# Patient Record
Sex: FEMALE | Race: WHITE | Employment: FULL TIME | URBAN - METROPOLITAN AREA
[De-identification: names, ages, dates, MRNs, and addresses within clinical notes are randomized per-mention and may not be internally consistent; named-entity substitution may affect disease eponyms.]

---

## 2017-03-13 ENCOUNTER — APPOINTMENT (OUTPATIENT)
Dept: GENERAL RADIOLOGY | Facility: CLINIC | Age: 37
End: 2017-03-13
Attending: FAMILY MEDICINE
Payer: COMMERCIAL

## 2017-03-13 ENCOUNTER — HOSPITAL ENCOUNTER (EMERGENCY)
Facility: CLINIC | Age: 37
Discharge: HOME | End: 2017-03-13
Attending: FAMILY MEDICINE | Admitting: FAMILY MEDICINE
Payer: COMMERCIAL

## 2017-03-13 VITALS
DIASTOLIC BLOOD PRESSURE: 85 MMHG | HEART RATE: 89 BPM | RESPIRATION RATE: 20 BRPM | TEMPERATURE: 97.2 F | OXYGEN SATURATION: 98 % | SYSTOLIC BLOOD PRESSURE: 132 MMHG

## 2017-03-13 DIAGNOSIS — R07.89 CHEST PAIN, NON-CARDIAC: ICD-10-CM

## 2017-03-13 LAB
ALBUMIN SERPL-MCNC: 3.5 G/DL (ref 3.4–5)
ALP SERPL-CCNC: 85 U/L (ref 40–150)
ALT SERPL W P-5'-P-CCNC: 25 U/L (ref 0–50)
ANION GAP SERPL CALCULATED.3IONS-SCNC: 6 MMOL/L (ref 3–14)
AST SERPL W P-5'-P-CCNC: 13 U/L (ref 0–45)
BASOPHILS # BLD AUTO: 0.1 10E9/L (ref 0–0.2)
BASOPHILS NFR BLD AUTO: 0.7 %
BILIRUB SERPL-MCNC: 0.3 MG/DL (ref 0.2–1.3)
BUN SERPL-MCNC: 9 MG/DL (ref 7–30)
CALCIUM SERPL-MCNC: 8.5 MG/DL (ref 8.5–10.1)
CHLORIDE SERPL-SCNC: 107 MMOL/L (ref 94–109)
CO2 SERPL-SCNC: 27 MMOL/L (ref 20–32)
CREAT SERPL-MCNC: 0.63 MG/DL (ref 0.52–1.04)
D DIMER PPP FEU-MCNC: 0.3 UG/ML FEU (ref 0–0.5)
DIFFERENTIAL METHOD BLD: NORMAL
EOSINOPHIL # BLD AUTO: 0.2 10E9/L (ref 0–0.7)
EOSINOPHIL NFR BLD AUTO: 2.5 %
ERYTHROCYTE [DISTWIDTH] IN BLOOD BY AUTOMATED COUNT: 13.2 % (ref 10–15)
GFR SERPL CREATININE-BSD FRML MDRD: NORMAL ML/MIN/1.7M2
GLUCOSE SERPL-MCNC: 92 MG/DL (ref 70–99)
HCT VFR BLD AUTO: 43.5 % (ref 35–47)
HGB BLD-MCNC: 14.5 G/DL (ref 11.7–15.7)
IMM GRANULOCYTES # BLD: 0 10E9/L (ref 0–0.4)
IMM GRANULOCYTES NFR BLD: 0.3 %
LIPASE SERPL-CCNC: 120 U/L (ref 73–393)
LYMPHOCYTES # BLD AUTO: 2.2 10E9/L (ref 0.8–5.3)
LYMPHOCYTES NFR BLD AUTO: 32 %
MCH RBC QN AUTO: 30.4 PG (ref 26.5–33)
MCHC RBC AUTO-ENTMCNC: 33.3 G/DL (ref 31.5–36.5)
MCV RBC AUTO: 91 FL (ref 78–100)
MONOCYTES # BLD AUTO: 0.7 10E9/L (ref 0–1.3)
MONOCYTES NFR BLD AUTO: 9.9 %
NEUTROPHILS # BLD AUTO: 3.8 10E9/L (ref 1.6–8.3)
NEUTROPHILS NFR BLD AUTO: 54.6 %
PLATELET # BLD AUTO: 215 10E9/L (ref 150–450)
POTASSIUM SERPL-SCNC: 4.1 MMOL/L (ref 3.4–5.3)
PROT SERPL-MCNC: 7.2 G/DL (ref 6.8–8.8)
RBC # BLD AUTO: 4.77 10E12/L (ref 3.8–5.2)
SODIUM SERPL-SCNC: 140 MMOL/L (ref 133–144)
TROPONIN I SERPL-MCNC: NORMAL UG/L (ref 0–0.04)
WBC # BLD AUTO: 6.9 10E9/L (ref 4–11)

## 2017-03-13 PROCEDURE — 83690 ASSAY OF LIPASE: CPT | Performed by: FAMILY MEDICINE

## 2017-03-13 PROCEDURE — 71020 XR CHEST 2 VW: CPT

## 2017-03-13 PROCEDURE — 25000128 H RX IP 250 OP 636: Performed by: FAMILY MEDICINE

## 2017-03-13 PROCEDURE — 93005 ELECTROCARDIOGRAM TRACING: CPT

## 2017-03-13 PROCEDURE — 96374 THER/PROPH/DIAG INJ IV PUSH: CPT

## 2017-03-13 PROCEDURE — 84484 ASSAY OF TROPONIN QUANT: CPT | Performed by: FAMILY MEDICINE

## 2017-03-13 PROCEDURE — 93010 ELECTROCARDIOGRAM REPORT: CPT | Performed by: FAMILY MEDICINE

## 2017-03-13 PROCEDURE — 85025 COMPLETE CBC W/AUTO DIFF WBC: CPT | Performed by: FAMILY MEDICINE

## 2017-03-13 PROCEDURE — 99285 EMERGENCY DEPT VISIT HI MDM: CPT | Mod: 25

## 2017-03-13 PROCEDURE — 80053 COMPREHEN METABOLIC PANEL: CPT | Performed by: FAMILY MEDICINE

## 2017-03-13 PROCEDURE — 99285 EMERGENCY DEPT VISIT HI MDM: CPT | Mod: 25 | Performed by: FAMILY MEDICINE

## 2017-03-13 PROCEDURE — 85379 FIBRIN DEGRADATION QUANT: CPT | Performed by: FAMILY MEDICINE

## 2017-03-13 RX ORDER — KETOROLAC TROMETHAMINE 30 MG/ML
30 INJECTION, SOLUTION INTRAMUSCULAR; INTRAVENOUS ONCE
Status: COMPLETED | OUTPATIENT
Start: 2017-03-13 | End: 2017-03-13

## 2017-03-13 RX ORDER — SUCRALFATE ORAL 1 G/10ML
1 SUSPENSION ORAL 4 TIMES DAILY
Qty: 100 ML | Refills: 0 | Status: SHIPPED | OUTPATIENT
Start: 2017-03-13 | End: 2019-04-30

## 2017-03-13 RX ADMIN — KETOROLAC TROMETHAMINE 30 MG: 30 INJECTION, SOLUTION INTRAMUSCULAR at 13:14

## 2017-03-13 ASSESSMENT — ENCOUNTER SYMPTOMS
DIAPHORESIS: 0
BLOOD IN STOOL: 0
HEADACHES: 1
ABDOMINAL PAIN: 0
VOMITING: 0
CHILLS: 0
SORE THROAT: 0
CHEST TIGHTNESS: 1
SINUS PRESSURE: 0
FEVER: 0
DYSURIA: 0
COUGH: 0
CONSTIPATION: 0
PALPITATIONS: 0
FREQUENCY: 0
DIARRHEA: 0
NAUSEA: 0
WHEEZING: 0
SHORTNESS OF BREATH: 0

## 2017-03-13 NOTE — ED NOTES
Intermittent chest pain since last Tuesday that radiates down left neck/arm and feels like pressure.   Provider in room.

## 2017-03-13 NOTE — ED NOTES
Pt walked to bathroom with no difficulty.   Writer was present during walk and in the bathroom per pt request.

## 2017-03-13 NOTE — ED PROVIDER NOTES
History     Chief Complaint   Patient presents with     Chest Pain     HPI  Funmi Coughlin is a 36 year old female who chest pain left sided left of sternum upper radiating into the left arm since tuesday.  no injury..  ?pleuritic. intermittent chest paroxysms of pain, otherwise an ache. non-exertional.  No associated shortness of breath, nausea/vomiting.. No fever. No cough. no wheezing.    positive tobacco    grandfather and aunt with Factor V Leiden  Denies recent prolonged travel (>3 hours) by car or plane, history or FHx of venous thromboembolism, recent surgery (last 4 weeks), active cancer history, hypercoagulable state, estrogen or other medications/conditions causing VTE or  new unilateral swelling or pain in the legs or calves.    History of GERD, hiatal hernia  migraines  Patient Active Problem List   Diagnosis     Other acne     Encounter for other general counseling or advice on contraception     Depressive disorder, not elsewhere classified     Current Outpatient Prescriptions   Medication Sig Dispense Refill     butalbital-acetaminophen-caffeine (FIORICET, ESGIC) -40 MG per tablet Take 1 tablet by mouth every 4 hours as needed       phentermine 37.5 MG capsule Take 37.5 mg by mouth every morning       cyclobenzaprine (FLEXERIL) 10 MG tablet Take 10 mg by mouth 3 times daily as needed        sulindac (CLINORIL) 200 MG tablet Take 1 tablet (200 mg) by mouth 2 times daily (with meals) never use with ibuprofen 20 tablet 0     IBUPROFEN PO Take 800 mg by mouth every 8 hours as needed for moderate pain        OMEPRAZOLE PO Take 40 mg by mouth every evening       SERTRALINE HCL PO Take 50 mg by mouth daily          Allergies   Allergen Reactions     Amoxicillin Rash     Ceclor [Cefaclor] Rash     Codeine Sulfate Nausea and Vomiting     Penicillins Rash     Varenicline Other (See Comments)     Nightmares         I have reviewed the Medications, Allergies, Past Medical and Surgical History, and  Social History in the Epic system.    Review of Systems   Constitutional: Negative for chills, diaphoresis and fever.   HENT: Negative for ear pain, sinus pressure and sore throat.    Eyes: Negative for visual disturbance.   Respiratory: Positive for chest tightness (central to left upper). Negative for cough, shortness of breath and wheezing.    Cardiovascular: Negative for palpitations.   Gastrointestinal: Negative for abdominal pain, blood in stool, constipation, diarrhea, nausea and vomiting.   Genitourinary: Negative for dysuria, frequency and urgency.   Skin: Negative for rash.   Neurological: Positive for headaches (intermittent, mild).   All other systems reviewed and are negative.      Physical Exam   BP: (!) 149/97  Pulse: 89  Temp: 97.2  F (36.2  C)  Resp: 20  SpO2: 98 %  Physical Exam   Constitutional: No distress.   HENT:   Mouth/Throat: Oropharynx is clear and moist.   Neck: Neck supple.   Cardiovascular: Normal rate and regular rhythm.  Exam reveals no gallop and no friction rub.    No murmur heard.  Pulses:       Radial pulses are 2+ on the right side, and 2+ on the left side.   Pulmonary/Chest: Effort normal and breath sounds normal. No respiratory distress. She has no wheezes. She has no rales. She exhibits tenderness.   Abdominal: She exhibits no distension. There is no tenderness. There is no rebound and no guarding.   Musculoskeletal: She exhibits no edema.   Neurological: She is alert.   Skin: No rash noted. She is not diaphoretic.       ED Course     ED Course     Procedures        EKG at 1252 hrs. with sinus rhythm at 70 bpm with a normal axis and no ST change.  No T wave changes.  Normal R progression and no Q waves.  no ectopy.  Normal intervals.  Normal conduction.  Impression sinus rhythm 70 bpm no change from EKG done on 09/05/2015    Critical Care time:  none     Results for orders placed or performed during the hospital encounter of 03/13/17   Chest XR,  PA & LAT    Narrative    XR  CHEST 2 VW 3/13/2017 1:51 PM    HISTORY: chest pain      Impression    IMPRESSION: Negative.    WILL GARCIA MD   Troponin I   Result Value Ref Range    Troponin I ES  0.000 - 0.045 ug/L     <0.015  The 99th percentile for upper reference range is 0.045 ug/L.  Troponin values in   the range of 0.045 - 0.120 ug/L may be associated with risks of adverse   clinical events.     CBC with platelets, differential   Result Value Ref Range    WBC 6.9 4.0 - 11.0 10e9/L    RBC Count 4.77 3.8 - 5.2 10e12/L    Hemoglobin 14.5 11.7 - 15.7 g/dL    Hematocrit 43.5 35.0 - 47.0 %    MCV 91 78 - 100 fl    MCH 30.4 26.5 - 33.0 pg    MCHC 33.3 31.5 - 36.5 g/dL    RDW 13.2 10.0 - 15.0 %    Platelet Count 215 150 - 450 10e9/L    Diff Method Automated Method     % Neutrophils 54.6 %    % Lymphocytes 32.0 %    % Monocytes 9.9 %    % Eosinophils 2.5 %    % Basophils 0.7 %    % Immature Granulocytes 0.3 %    Absolute Neutrophil 3.8 1.6 - 8.3 10e9/L    Absolute Lymphocytes 2.2 0.8 - 5.3 10e9/L    Absolute Monocytes 0.7 0.0 - 1.3 10e9/L    Absolute Eosinophils 0.2 0.0 - 0.7 10e9/L    Absolute Basophils 0.1 0.0 - 0.2 10e9/L    Abs Immature Granulocytes 0.0 0 - 0.4 10e9/L   Comprehensive metabolic panel   Result Value Ref Range    Sodium 140 133 - 144 mmol/L    Potassium 4.1 3.4 - 5.3 mmol/L    Chloride 107 94 - 109 mmol/L    Carbon Dioxide 27 20 - 32 mmol/L    Anion Gap 6 3 - 14 mmol/L    Glucose 92 70 - 99 mg/dL    Urea Nitrogen 9 7 - 30 mg/dL    Creatinine 0.63 0.52 - 1.04 mg/dL    GFR Estimate >90  Non  GFR Calc   >60 mL/min/1.7m2    GFR Estimate If Black >90   GFR Calc   >60 mL/min/1.7m2    Calcium 8.5 8.5 - 10.1 mg/dL    Bilirubin Total 0.3 0.2 - 1.3 mg/dL    Albumin 3.5 3.4 - 5.0 g/dL    Protein Total 7.2 6.8 - 8.8 g/dL    Alkaline Phosphatase 85 40 - 150 U/L    ALT 25 0 - 50 U/L    AST 13 0 - 45 U/L   Lipase   Result Value Ref Range    Lipase 120 73 - 393 U/L   D dimer quantitative   Result Value Ref  Range    D Dimer 0.3 0.0 - 0.50 ug/ml FEU                  Assessments & Plan (with Medical Decision Making)     MDM: Char Coughlin is a 36 year old female presented with one week of anterior chest pain radiating into the left arm that was nonexertional and without significant underlying risks other than tobacco abuse. symmetric radial pulses and no radiation to back. She has also possible family history of factor V Leiden but has no history of venous thromboembolism herself and no other risk factors. d-dimer normal. Troponin is normal. EKG is the same as prior with no acute changes. We discussed differential diagnosis including gi causes as she does have a history of hiatal hernia and reflux. Also discussed chest wall pain although she had no relief with Toradol given in the emergency department. Her chest however is tender to palpation and reproduces some of her symptoms. Radiation into the left arm is more difficult to identify a common cause although esophageal could cause this as well. She has no underlying neck pain or radicular symptoms. Discussed recommendations as below and recommended follow-up with her primary provider this week  I have reviewed the nursing notes.    I have reviewed the findings, diagnosis, plan and need for follow up with the patient.    New Prescriptions    No medications on file       Final diagnoses:   Chest pain, non-cardiac - Unclear cause.  normal tests including D-dimer, troponin, ekg, cxr.  No relief with toradol, but still would consider chest wall.  other possible causes include esophagus - ladarius with history of GERD and hiatal hernia.  In addition to omeprazole could consider carafate/sucralfate.  Follow-up in clinic witin 3-4 days for re-eval.   radiation into arm, could have multiple causes including cervical disc.  esophagus can also radiate pain this way.       3/13/2017   [facility] EMERGENCY DEPARTMENT     Roldan Martinez MD  03/13/17 0042

## 2017-03-13 NOTE — DISCHARGE INSTRUCTIONS
ICD-10-CM    1. Chest pain, non-cardiac R07.89     Unclear cause.  normal tests including D-dimer, troponin, ekg, cxr.  No relief with toradol, but still would consider chest wall.  other possible causes include esophagus - ladarius with history of GERD and hiatal hernia.  In addition to omeprazole could consider carafate/sucralfate.  Follow-up in clinic witin 3-4 days for re-eval.   radiation into arm, could have multiple causes including cervical disc.  esophagus can also radiate pain this way.          *CHEST PAIN, UNCERTAIN CAUSE    Based on your exam today, the exact cause of your chest pain is not certain. Your condition does not seem serious at this time, and your pain does not appear to be coming from your heart. However, sometimes the signs of a serious problem take more time to appear. Therefore, watch for the warning signs listed below.  HOME CARE:  1. Rest today and avoid strenuous activity.  2. Take any prescribed medicine as directed.  FOLLOW UP with your doctor in 1-3 days.   GET PROMPT MEDICAL ATTENTION if any of the following occur:    A change in the type of pain: if it feels different, becomes more severe, lasts longer, or begins to spread into your shoulder, arm, neck, jaw or back    Shortness of breath or increased pain with breathing    Weakness, dizziness, or fainting    Cough with blood or dark colored sputum (phlegm)    Fever over 101  F (38.3  C)    Swelling, pain or redness in one leg    8239-7343 [facility], [address] [place] [address], [place] [address]. All rights reserved. This information is not intended as a substitute for professional medical care. Always follow your healthcare professional's instructions.

## 2017-03-13 NOTE — ED AVS SNAPSHOT
[facility] Emergency Department    [address] [address]    Phone:  [phone]    Fax:  [phone]                                       Funmi Coughlin   MRN: [id number]    Department:  [facility] Emergency Department   Date of Visit:  3/13/2017           Patient Information     Date Of Birth          1980        Your diagnoses for this visit were:     Chest pain, non-cardiac Unclear cause.  normal tests including D-dimer, troponin, ekg, cxr.  No relief with toradol, but still would consider chest wall.  other possible causes include esophagus - ladarius with history of GERD and hiatal hernia.  In addition to omeprazole could consider carafate/sucralfate.  Follow-up in clinic witin 3-4 days for re-eval.   radiation into arm, could have multiple causes including cervical disc.  esophagus can also radiate pain this way.       You were seen by Roldan Martinez MD.      Follow-up Information     Follow up with Jania Rojas MD In 3 days.    Specialty:  Family Practice    Contact information:    [facility]  [address] [address]  [phone]          Follow up with [facility] Emergency Department.    Specialty:  EMERGENCY MEDICINE    Why:  As needed, If symptoms worsen    Contact information:    [address] [place] [id number] [phone]    Additional information:    The medical center is located at   [address]. (between [address] and   [address] in [place], [place] miles north   of [place]).        Discharge Instructions         ICD-10-CM    1. Chest pain, non-cardiac R07.89     Unclear cause.  normal tests including D-dimer, troponin, ekg, cxr.  No relief with toradol, but still would consider chest wall.  other possible causes include esophagus - ladarius with history of GERD and hiatal hernia.  In addition to omeprazole could consider carafate/sucralfate.  Follow-up in clinic witin 3-4 days for re-eval.   radiation into arm,  could have multiple causes including cervical disc.  esophagus can also radiate pain this way.          *CHEST PAIN, UNCERTAIN CAUSE    Based on your exam today, the exact cause of your chest pain is not certain. Your condition does not seem serious at this time, and your pain does not appear to be coming from your heart. However, sometimes the signs of a serious problem take more time to appear. Therefore, watch for the warning signs listed below.  HOME CARE:  1. Rest today and avoid strenuous activity.  2. Take any prescribed medicine as directed.  FOLLOW UP with your doctor in 1-3 days.   GET PROMPT MEDICAL ATTENTION if any of the following occur:    A change in the type of pain: if it feels different, becomes more severe, lasts longer, or begins to spread into your shoulder, arm, neck, jaw or back    Shortness of breath or increased pain with breathing    Weakness, dizziness, or fainting    Cough with blood or dark colored sputum (phlegm)    Fever over 101  F (38.3  C)    Swelling, pain or redness in one leg    8372-1564 [place] [address]. All rights reserved. This information is not intended as a substitute for professional medical care. Always follow your healthcare professional's instructions.      24 Hour Appointment Hotline       To make an appointment at any [facility] clinic, call [phone] ([phone]). If you don't have a family doctor or clinic, we will help you find one. [facility] clinics are conveniently located to serve the needs of you and your family.             Review of your medicines      START taking        Dose / Directions Last dose taken    sucralfate 1 GM/10ML suspension   Commonly known as:  CARAFATE   Dose:  1 g   Quantity:  100 mL        Take 10 mLs (1 g) by mouth 4 times daily   Refills:  0          Our records show that you are taking the medicines listed below. If these are incorrect, please call your family doctor or clinic.         Dose / Directions Last dose taken    cyclobenzaprine 10 MG tablet   Commonly known as:  FLEXERIL   Dose:  10 mg        Take 10 mg by mouth 3 times daily as needed   Refills:  0        IBUPROFEN PO   Dose:  800 mg        Take 800 mg by mouth every 8 hours as needed for moderate pain   Refills:  0        OMEPRAZOLE PO   Dose:  40 mg        Take 40 mg by mouth every evening   Refills:  0        sulindac 200 MG tablet   Commonly known as:  CLINORIL   Dose:  200 mg   Quantity:  20 tablet        Take 1 tablet (200 mg) by mouth 2 times daily (with meals) never use with ibuprofen   Refills:  0                Prescriptions were sent or printed at these locations (1 Prescription)                   [id number] - [place], [place] - [address] AT [facility] [place]   [address] [address]    Telephone:  [phone]   Fax:  [phone]   Hours:                  E-Prescribed (1 of 1)         sucralfate (CARAFATE) 1 GM/10ML suspension                Procedures and tests performed during your visit     CBC with platelets, differential    Chest XR,  PA & LAT    Comprehensive metabolic panel    D dimer quantitative    EKG 12 lead    Lipase    Troponin I      Orders Needing Specimen Collection     None      Pending Results     No orders found from 3/11/2017 to 3/14/2017.            Pending Culture Results     No orders found from 3/11/2017 to 3/14/2017.             Test Results from your hospital stay     3/13/2017  1:41 PM - Interface, Flexilab Results      Component Results     Component Value Ref Range & Units Status    Troponin I ES  0.000 - 0.045 ug/L Final    <0.015  The 99th percentile for upper reference range is 0.045 ug/L.  Troponin values in   the range of 0.045 - 0.120 ug/L may be associated with risks of adverse   clinical events.           3/13/2017  2:02 PM - Interface, Radiant Ib      Narrative     XR CHEST 2 VW 3/13/2017 1:51 PM    HISTORY: chest pain         Impression     IMPRESSION: Negative.    WILL GARCIA MD         3/13/2017  1:23 PM - Interface, Flexilab Results      Component Results     Component Value Ref Range & Units Status    WBC 6.9 4.0 - 11.0 10e9/L Final    RBC Count 4.77 3.8 - 5.2 10e12/L Final    Hemoglobin 14.5 11.7 - 15.7 g/dL Final    Hematocrit 43.5 35.0 - 47.0 % Final    MCV 91 78 - 100 fl Final    MCH 30.4 26.5 - 33.0 pg Final    MCHC 33.3 31.5 - 36.5 g/dL Final    RDW 13.2 10.0 - 15.0 % Final    Platelet Count 215 150 - 450 10e9/L Final    Diff Method Automated Method  Final    % Neutrophils 54.6 % Final    % Lymphocytes 32.0 % Final    % Monocytes 9.9 % Final    % Eosinophils 2.5 % Final    % Basophils 0.7 % Final    % Immature Granulocytes 0.3 % Final    Absolute Neutrophil 3.8 1.6 - 8.3 10e9/L Final    Absolute Lymphocytes 2.2 0.8 - 5.3 10e9/L Final    Absolute Monocytes 0.7 0.0 - 1.3 10e9/L Final    Absolute Eosinophils 0.2 0.0 - 0.7 10e9/L Final    Absolute Basophils 0.1 0.0 - 0.2 10e9/L Final    Abs Immature Granulocytes 0.0 0 - 0.4 10e9/L Final         3/13/2017  1:41 PM - Interface, Flexilab Results      Component Results     Component Value Ref Range & Units Status    Sodium 140 133 - 144 mmol/L Final    Potassium 4.1 3.4 - 5.3 mmol/L Final    Chloride 107 94 - 109 mmol/L Final    Carbon Dioxide 27 20 - 32 mmol/L Final    Anion Gap 6 3 - 14 mmol/L Final    Glucose 92 70 - 99 mg/dL Final    Urea Nitrogen 9 7 - 30 mg/dL Final    Creatinine 0.63 0.52 - 1.04 mg/dL Final    GFR Estimate >90  Non  GFR Calc   >60 mL/min/1.7m2 Final    GFR Estimate If Black >90   GFR Calc   >60 mL/min/1.7m2 Final    Calcium 8.5 8.5 - 10.1 mg/dL Final    Bilirubin Total 0.3 0.2 - 1.3 mg/dL Final    Albumin 3.5 3.4 - 5.0 g/dL Final    Protein Total 7.2 6.8 - 8.8 g/dL Final    Alkaline Phosphatase 85 40 - 150 U/L Final    ALT 25 0 - 50 U/L Final    AST 13 0 - 45 U/L Final         3/13/2017  1:41 PM - Interface, Flexilab Results  "     Component Results     Component Value Ref Range & Units Status    Lipase 120 73 - 393 U/L Final         3/13/2017  1:33 PM - Interface, Flexilab Results      Component Results     Component Value Ref Range & Units Status    D Dimer 0.3 0.0 - 0.50 ug/ml FEU Final    This D-dimer assay is intended for use in conjuntion with a clinical pretest   probability assessment model to exclude pulmonary embolism (PE) and as an aid   in the diagnosis of deep venous thrombosis (DVT) in outpatients suspected of   PE   or DVT. The cut-off value is 0.5 g/mL FEU.                  Thank you for choosing [facility]       Thank you for choosing [facility] for your care. Our goal is always to provide you with excellent care. Hearing back from our patients is one way we can continue to improve our services. Please take a few minutes to complete the written survey that you may receive in the mail after you visit with us. Thank you!        [employer] Information     [employer] lets you send messages to your doctor, view your test results, renew your prescriptions, schedule appointments and more. To sign up, go to [email or web address] . Click on \"Log in\" on the left side of the screen, which will take you to the Welcome page. Then click on \"Sign up Now\" on the right side of the page.     You will be asked to enter the access code listed below, as well as some personal information. Please follow the directions to create your username and password.     Your access code is: [email or web address]  Expires: 2017  2:11 PM     Your access code will  in 90 days. If you need help or a new code, please call your [facility] clinic or [phone].        Care EveryWhere ID     This is your Care EveryWhere ID. This could be used by other organizations to access your [facility] medical records  [id number]        After Visit Summary       This is your record. Keep this with you and show to your community pharmacist(s) and doctor(s) at your next visit.  "

## 2017-03-13 NOTE — ED AVS SNAPSHOT
[facility] Emergency Department    [address] [address]    Phone:  [phone]    Fax:  [phone]                                       Funmi Coughlin   MRN: [id number]    Department:  [facility] Emergency Department   Date of Visit:  3/13/2017           After Visit Summary Signature Page     I have received my discharge instructions, and my questions have been answered. I have discussed any challenges I see with this plan with the nurse or doctor.    ..........................................................................................................................................  Patient/Patient Representative Signature      ..........................................................................................................................................  Patient Representative Print Name and Relationship to Patient    ..................................................               ................................................  Date                                            Time    ..........................................................................................................................................  Reviewed by Signature/Title    ...................................................              ..............................................  Date                                                            Time

## 2017-07-17 ENCOUNTER — APPOINTMENT (OUTPATIENT)
Dept: GENERAL RADIOLOGY | Facility: CLINIC | Age: 37
End: 2017-07-17
Attending: FAMILY MEDICINE
Payer: COMMERCIAL

## 2017-07-17 ENCOUNTER — HOSPITAL ENCOUNTER (EMERGENCY)
Facility: CLINIC | Age: 37
Discharge: HOME | End: 2017-07-17
Attending: EMERGENCY MEDICINE | Admitting: EMERGENCY MEDICINE
Payer: COMMERCIAL

## 2017-07-17 VITALS
BODY MASS INDEX: 33.8 KG/M2 | TEMPERATURE: 98.6 F | HEIGHT: 64 IN | RESPIRATION RATE: 16 BRPM | WEIGHT: 198 LBS | DIASTOLIC BLOOD PRESSURE: 85 MMHG | OXYGEN SATURATION: 97 % | SYSTOLIC BLOOD PRESSURE: 132 MMHG

## 2017-07-17 DIAGNOSIS — M79.672 LEFT FOOT PAIN: ICD-10-CM

## 2017-07-17 PROCEDURE — 99283 EMERGENCY DEPT VISIT LOW MDM: CPT

## 2017-07-17 PROCEDURE — 99283 EMERGENCY DEPT VISIT LOW MDM: CPT | Performed by: EMERGENCY MEDICINE

## 2017-07-17 PROCEDURE — 73630 X-RAY EXAM OF FOOT: CPT | Mod: LT

## 2017-07-17 NOTE — ED AVS SNAPSHOT
[facility] Emergency Department    [address] [address]    Phone:  [phone]    Fax:  [phone]                                       Funmi Coughlin   MRN: [id number]    Department:  [facility] Emergency Department   Date of Visit:  7/17/2017           After Visit Summary Signature Page     I have received my discharge instructions, and my questions have been answered. I have discussed any challenges I see with this plan with the nurse or doctor.    ..........................................................................................................................................  Patient/Patient Representative Signature      ..........................................................................................................................................  Patient Representative Print Name and Relationship to Patient    ..................................................               ................................................  Date                                            Time    ..........................................................................................................................................  Reviewed by Signature/Title    ...................................................              ..............................................  Date                                                            Time

## 2017-07-17 NOTE — ED AVS SNAPSHOT
[facility] Emergency Department    [address] [address]    Phone:  [phone]    Fax:  [phone]                                       Funmi Coughlin   MRN: [id number]    Department:  [facility] Emergency Department   Date of Visit:  7/17/2017           Patient Information     Date Of Birth          1980        Your diagnoses for this visit were:     Left foot pain        You were seen by Sha Victoria MD.        Discharge Instructions       Use rest, ice, acetaminophen, and ibuprofen to help with her symptoms.  Use the hard soled shoe to give support to your foot over the next several days.  Get rechecked if not improved in one week.    24 Hour Appointment Hotline       To make an appointment at any [place] clinic, call [phone] ([phone]). If you don't have a family doctor or clinic, we will help you find one. [place] clinics are conveniently located to serve the needs of you and your family.          ED Discharge Orders     Post-Op Shoe                    Review of your medicines      Our records show that you are taking the medicines listed below. If these are incorrect, please call your family doctor or clinic.        Dose / Directions Last dose taken    cyclobenzaprine 10 MG tablet   Commonly known as:  FLEXERIL   Dose:  10 mg        Take 10 mg by mouth 3 times daily as needed   Refills:  0        IBUPROFEN PO   Dose:  800 mg        Take 800 mg by mouth every 8 hours as needed for moderate pain   Refills:  0        OMEPRAZOLE PO   Dose:  40 mg        Take 40 mg by mouth every evening   Refills:  0        sucralfate 1 GM/10ML suspension   Commonly known as:  CARAFATE   Dose:  1 g   Quantity:  100 mL        Take 10 mLs (1 g) by mouth 4 times daily   Refills:  0        sulindac 200 MG tablet   Commonly known as:  CLINORIL   Dose:  200 mg   Quantity:  20 tablet        Take 1 tablet (200 mg) by mouth 2 times daily (with meals) never use with  "ibuprofen   Refills:  0                Procedures and tests performed during your visit     Foot XR, G/E 3 views, left      Orders Needing Specimen Collection     None      Pending Results     No orders found from 7/15/2017 to 7/18/2017.            Pending Culture Results     No orders found from 7/15/2017 to 7/18/2017.            Pending Results Instructions     If you had any lab results that were not finalized at the time of your Discharge, you can call the ED Lab Result RN at [phone]. You will be contacted by this team for any positive Lab results or changes in treatment. The nurses are available 7 days a week from 10A to 6:30P.  You can leave a message 24 hours per day and they will return your call.        Test Results From Your Hospital Stay        7/17/2017 10:41 PM      Narrative     XR FOOT LT G/E 3 VW 7/17/2017 10:37 PM    HISTORY: Trauma.    COMPARISON: None.    FINDINGS: No fracture or malalignment. Osseous structures are within  normal limits.        Impression     IMPRESSION: No acute osseous abnormality.    RAÚL ASCENCIO MD                Thank you for choosing [facility]       Thank you for choosing [facility] for your care. Our goal is always to provide you with excellent care. Hearing back from our patients is one way we can continue to improve our services. Please take a few minutes to complete the written survey that you may receive in the mail after you visit with us. Thank you!        [employer] Information     "[employer]," lets you send messages to your doctor, view your test results, renew your prescriptions, schedule appointments and more. To sign up, go to [email or web address] . Click on \"Log in\" on the left side of the screen, which will take you to the Welcome page. Then click on \"Sign up Now\" on the right side of the page.     You will be asked to enter the access code listed below, as well as some personal information. Please follow the directions to create your username and password.   "   Your access code is: [email or web address]  Expires: 10/15/2017 10:47 PM     Your access code will  in 90 days. If you need help or a new code, please call your [place] clinic or [phone].        Care EveryWhere ID     This is your Care EveryWhere ID. This could be used by other organizations to access your [place] medical records  [id number]        Equal Access to Services     ADDI MOY : Hadii julia magañao Sojaelyn, waaxda luqadaha, qaybta kaalmada adezo, josy rasmussen . So [facility] [phone].    ATENCIÓN: Si habla español, tiene a lion disposición servicios gratuitos de asistencia lingüística. Llame al [phone].    We comply with applicable federal civil rights laws and [place] laws. We do not discriminate on the basis of race, color, national origin, age, disability sex, sexual orientation or gender identity.            After Visit Summary       This is your record. Keep this with you and show to your community pharmacist(s) and doctor(s) at your next visit.

## 2017-07-18 NOTE — ED PROVIDER NOTES
"  History     Chief Complaint   Patient presents with     Foot Pain     L foot injury/ fall     [facility]  Funmi Coughlin is a 36 year old female who presents for left foot pain.  Symptoms started just prior to arrival when she slipped in the garage and had an injury of the foot.  She says that she thinks her forefoot was caught underneath and bent back.  She denies falling or hitting her head.  Pain is located in the lateral midfoot, moderate in severity.  She has taken ibuprofen.  No headache, nausea, vomiting.    Past medical history includes GERD  Daily medications include omeprazole  Allergies include amoxicillin, cefaclor, penicillin, varenicline, codeine  Current every day smoker    I have reviewed the Medications, Allergies, Past Medical and Surgical History, and Social History in the Epic system.         Review of Systems  A 2 point review of systems was performed. All pertinent positives and negatives were listed in the HPI and rest of ROS were otherwise negative.    Physical Exam   BP: 132/85  Heart Rate: 93  Temp: 98.6  F (37  C)  Resp: 16  Height: 162.6 cm (5' 4\")  Weight: 89.8 kg (198 lb)  SpO2: 97 %  Physical Exam   Constitutional: She is oriented to person, place, and time. She appears well-developed and well-nourished. No distress.   HENT:   Head: Normocephalic and atraumatic.   Eyes: No scleral icterus.   Neck: Normal range of motion. Neck supple.   Musculoskeletal:   Left Foot and Ankle: swelling over the lateral midfoot with tenderness at the base of the 5th metatarsal; no deformity, erythema, or warmth; no tenderness over medial or lateral malleoli, navicular, or ankle syndesmosis; full ankle ROM; anterior drawer test negative; no laxity of the Lisfranc joint complex.   Neurological: She is alert and oriented to person, place, and time.   Skin: Skin is warm and dry. No rash noted. She is not diaphoretic. No erythema. No pallor.       ED Course     ED Course     Procedures             Critical Care " time:  none               Labs Ordered and Resulted from Time of ED Arrival Up to the Time of Departure from the ED - No data to display    Assessments & Plan (with Medical Decision Making)   36-year-old female who presents for left foot pain.  Differential includes fracture, dislocation, soft tissue injury.  X-ray of the foot obtained, images reviewed independently as well as radiology reviewed, no definite fracture seen, however there is a small line through the 5th metatarsal that may represent an avulsion fracture of the styloid.  Regardless of if this is a fracture or not, she is placed in an Ace wrap and given a postop shoe and crutches.  She is told to use the crutches for a short amount of time as possible.  Ibuprofen and acetaminophen for pain.  Recheck if not improved in one week.  The patient is in agreement with this plan.    I have reviewed the nursing notes.    I have reviewed the findings, diagnosis, plan and need for follow up with the patient.       New Prescriptions    No medications on file       Final diagnoses:   Left foot pain       7/17/2017   [facility] EMERGENCY DEPARTMENT     Sha Victoria MD  07/17/17 [id number]

## 2017-07-18 NOTE — DISCHARGE INSTRUCTIONS
Use rest, ice, acetaminophen, and ibuprofen to help with her symptoms.  Use the hard soled shoe to give support to your foot over the next several days.  Get rechecked if not improved in one week.

## 2017-08-26 ENCOUNTER — HEALTH MAINTENANCE LETTER (OUTPATIENT)
Age: 37
End: 2017-08-26

## 2018-02-18 ENCOUNTER — APPOINTMENT (OUTPATIENT)
Dept: GENERAL RADIOLOGY | Facility: CLINIC | Age: 38
End: 2018-02-18
Attending: EMERGENCY MEDICINE
Payer: COMMERCIAL

## 2018-02-18 ENCOUNTER — HOSPITAL ENCOUNTER (EMERGENCY)
Facility: CLINIC | Age: 38
Discharge: HOME | End: 2018-02-18
Attending: EMERGENCY MEDICINE | Admitting: EMERGENCY MEDICINE
Payer: COMMERCIAL

## 2018-02-18 VITALS
TEMPERATURE: 98.6 F | OXYGEN SATURATION: 96 % | DIASTOLIC BLOOD PRESSURE: 58 MMHG | SYSTOLIC BLOOD PRESSURE: 112 MMHG | RESPIRATION RATE: 16 BRPM

## 2018-02-18 DIAGNOSIS — M79.672 LEFT FOOT PAIN: ICD-10-CM

## 2018-02-18 DIAGNOSIS — R07.89 CHEST WALL PAIN: ICD-10-CM

## 2018-02-18 LAB
ANION GAP SERPL CALCULATED.3IONS-SCNC: 7 MMOL/L (ref 3–14)
BASOPHILS # BLD AUTO: 0 10E9/L (ref 0–0.2)
BASOPHILS NFR BLD AUTO: 0.4 %
BUN SERPL-MCNC: 11 MG/DL (ref 7–30)
CALCIUM SERPL-MCNC: 8.7 MG/DL (ref 8.5–10.1)
CHLORIDE SERPL-SCNC: 106 MMOL/L (ref 94–109)
CO2 SERPL-SCNC: 27 MMOL/L (ref 20–32)
CREAT SERPL-MCNC: 0.54 MG/DL (ref 0.52–1.04)
D DIMER PPP FEU-MCNC: <0.3 UG/ML FEU (ref 0–0.5)
DIFFERENTIAL METHOD BLD: NORMAL
EOSINOPHIL # BLD AUTO: 0.2 10E9/L (ref 0–0.7)
EOSINOPHIL NFR BLD AUTO: 2.4 %
ERYTHROCYTE [DISTWIDTH] IN BLOOD BY AUTOMATED COUNT: 13.2 % (ref 10–15)
GFR SERPL CREATININE-BSD FRML MDRD: >90 ML/MIN/1.7M2
GLUCOSE SERPL-MCNC: 102 MG/DL (ref 70–99)
HCT VFR BLD AUTO: 44.3 % (ref 35–47)
HGB BLD-MCNC: 15.2 G/DL (ref 11.7–15.7)
IMM GRANULOCYTES # BLD: 0 10E9/L (ref 0–0.4)
IMM GRANULOCYTES NFR BLD: 0.3 %
LYMPHOCYTES # BLD AUTO: 3.2 10E9/L (ref 0.8–5.3)
LYMPHOCYTES NFR BLD AUTO: 33.3 %
MCH RBC QN AUTO: 31 PG (ref 26.5–33)
MCHC RBC AUTO-ENTMCNC: 34.3 G/DL (ref 31.5–36.5)
MCV RBC AUTO: 90 FL (ref 78–100)
MONOCYTES # BLD AUTO: 0.9 10E9/L (ref 0–1.3)
MONOCYTES NFR BLD AUTO: 9.3 %
NEUTROPHILS # BLD AUTO: 5.2 10E9/L (ref 1.6–8.3)
NEUTROPHILS NFR BLD AUTO: 54.3 %
PLATELET # BLD AUTO: 239 10E9/L (ref 150–450)
POTASSIUM SERPL-SCNC: 3.7 MMOL/L (ref 3.4–5.3)
RBC # BLD AUTO: 4.91 10E12/L (ref 3.8–5.2)
SODIUM SERPL-SCNC: 140 MMOL/L (ref 133–144)
TROPONIN I SERPL-MCNC: <0.015 UG/L (ref 0–0.04)
TROPONIN I SERPL-MCNC: <0.015 UG/L (ref 0–0.04)
WBC # BLD AUTO: 9.6 10E9/L (ref 4–11)

## 2018-02-18 PROCEDURE — 93010 ELECTROCARDIOGRAM REPORT: CPT | Mod: Z6 | Performed by: EMERGENCY MEDICINE

## 2018-02-18 PROCEDURE — 99285 EMERGENCY DEPT VISIT HI MDM: CPT | Mod: 25 | Performed by: EMERGENCY MEDICINE

## 2018-02-18 PROCEDURE — 93005 ELECTROCARDIOGRAM TRACING: CPT

## 2018-02-18 PROCEDURE — 80048 BASIC METABOLIC PNL TOTAL CA: CPT | Performed by: EMERGENCY MEDICINE

## 2018-02-18 PROCEDURE — 85025 COMPLETE CBC W/AUTO DIFF WBC: CPT | Performed by: EMERGENCY MEDICINE

## 2018-02-18 PROCEDURE — 99285 EMERGENCY DEPT VISIT HI MDM: CPT

## 2018-02-18 PROCEDURE — 85379 FIBRIN DEGRADATION QUANT: CPT | Performed by: EMERGENCY MEDICINE

## 2018-02-18 PROCEDURE — 71046 X-RAY EXAM CHEST 2 VIEWS: CPT

## 2018-02-18 PROCEDURE — 84484 ASSAY OF TROPONIN QUANT: CPT | Performed by: EMERGENCY MEDICINE

## 2018-02-18 NOTE — ED AVS SNAPSHOT
[facility] Emergency Department    [address] [address]    Phone:  [phone]    Fax:  [phone]                                       Funmi Coughlin   MRN: [id number]    Department:  [facility] Emergency Department   Date of Visit:  2/18/2018           After Visit Summary Signature Page     I have received my discharge instructions, and my questions have been answered. I have discussed any challenges I see with this plan with the nurse or doctor.    ..........................................................................................................................................  Patient/Patient Representative Signature      ..........................................................................................................................................  Patient Representative Print Name and Relationship to Patient    ..................................................               ................................................  Date                                            Time    ..........................................................................................................................................  Reviewed by Signature/Title    ...................................................              ..............................................  Date                                                            Time

## 2018-02-18 NOTE — ED AVS SNAPSHOT
[facility] Emergency Department    [address] [address]    Phone:  [phone]    Fax:  [phone]                                       Funmi Coughlin   MRN: [id number]    Department:  [facility] Emergency Department   Date of Visit:  2/18/2018           Patient Information     Date Of Birth          1980        Your diagnoses for this visit were:     Chest wall pain     Left foot pain        You were seen by Roldan Lopez MD.      Follow-up Information     Follow up with Jania Rojas MD.    Specialty:  Family Practice    Contact information:    [facility]  [address] [address]  [phone]          Discharge Instructions          *CHEST PAIN, UNCERTAIN CAUSE    Based on your exam today, the exact cause of your chest pain is not certain. Your condition does not seem serious at this time, and your pain does not appear to be coming from your heart. However, sometimes the signs of a serious problem take more time to appear. Therefore, watch for the warning signs listed below.  HOME CARE:  1. Rest today and avoid strenuous activity.  2. Take any prescribed medicine as directed.  FOLLOW UP with your doctor in 1-3 days.   GET PROMPT MEDICAL ATTENTION if any of the following occur:    A change in the type of pain: if it feels different, becomes more severe, lasts longer, or begins to spread into your shoulder, arm, neck, jaw or back    Shortness of breath or increased pain with breathing    Weakness, dizziness, or fainting    Cough with blood or dark colored sputum (phlegm)    Fever over 101  F (38.3  C)    Swelling, pain or redness in one leg    4407-8933 [employer]. [address] [place] [address]. All rights reserved. This information is not intended as a substitute for professional medical care. Always follow your healthcare professional's instructions.  This information has been modified by your health care  provider with permission from the publisher.      Chest Wall Pain: Costochondritis    The chest pain that you have had today is caused by costochondritis. This condition is caused by an inflammation of the cartilage joining your ribs to your breastbone. It is not caused by heart or lung problems. Your healthcare team has made sure that the chest pain you feel is not from a life threatening cause of chest pain such as heart attack, collapsed lung, blood clot in the lung, tear in the aorta, or esophageal rupture. The inflammation may have been brought on by a blow to the chest, lifting heavy objects, intense exercise, or an illness that made you cough and sneeze a lot. It often occurs during times of emotional stress. It can be painful, but it is not dangerous. It usually goes away in 1 to 2 weeks. But it may happen again. Rarely, a more serious condition may cause symptoms similar to costochondritis. That s why it s important to watch for the warning signs listed below.  Home care  Follow these guidelines when caring for yourself at home:    If you feel that emotional stress is a cause of your condition, try to figure out the sources of that stress. It may not be obvious. Learn ways to deal with the stress in your life. This can include regular exercise, muscle relaxation, meditation, or simply taking time out for yourself.    You may use acetaminophen, ibuprofen, or naproxen to control pain, unless another pain medicine was prescribed. If you have liver or kidney disease or ever had a stomach ulcer, talk with your healthcare provider before using these medicines.    You can also help ease pain by using a hot, wet compress or heating pad. Use this with or without a medicated skin cream that helps relieves pain.    Do stretching exercise as advised by your provider.    Take any prescribed medicines as directed.  Follow-up care  Follow up with your healthcare provider, or as advised, if you do not start to get better  in the next 2 days.  When to seek medical advice  Call your healthcare provider right away if any of these occur:    A change in the type of pain. Call if it feels different, becomes more serious, lasts longer, or spreads into your shoulder, arm, neck, jaw, or back.    Shortness of breath or pain gets worse when you breathe    Weakness, dizziness, or fainting    Cough with dark-colored sputum (phlegm) or blood    Abdominal pain    Dark red or black stools    Fever of 100.4 F (38 C) or higher, or as directed by your healthcare provider  Date Last Reviewed: 12/1/2016 2000-2017 [employer]. [address] [place] [address]. All rights reserved. This information is not intended as a substitute for professional medical care. Always follow your healthcare professional's instructions.      Tylenol/ibuprofen for discomfort    24 Hour Appointment Hotline       To make an appointment at any [place] clinic, call [phone] ([phone]). If you don't have a family doctor or clinic, we will help you find one. [place] clinics are conveniently located to serve the needs of you and your family.             Review of your medicines      Our records show that you are taking the medicines listed below. If these are incorrect, please call your family doctor or clinic.        Dose / Directions Last dose taken    cyclobenzaprine 10 MG tablet   Commonly known as:  FLEXERIL   Dose:  10 mg        Take 10 mg by mouth 3 times daily as needed   Refills:  0        IBUPROFEN PO   Dose:  800 mg        Take 800 mg by mouth every 8 hours as needed for moderate pain   Refills:  0        OMEPRAZOLE PO   Dose:  40 mg        Take 40 mg by mouth every evening   Refills:  0        sucralfate 1 GM/10ML suspension   Commonly known as:  CARAFATE   Dose:  1 g   Quantity:  100 mL        Take 10 mLs (1 g) by mouth 4 times daily   Refills:  0        sulindac 200 MG tablet   Commonly known as:  CLINORIL   Dose:  200 mg    Quantity:  20 tablet        Take 1 tablet (200 mg) by mouth 2 times daily (with meals) never use with ibuprofen   Refills:  0                Procedures and tests performed during your visit     Procedure/Test Number of Times Performed    Basic metabolic panel 1    CBC with platelets, differential 1    Chest XR,  PA & LAT 1    D dimer quantitative 1    EKG 12 lead 1    Troponin I 2      Orders Needing Specimen Collection     None      Pending Results     No orders found from 2/16/2018 to 2/19/2018.            Pending Culture Results     No orders found from 2/16/2018 to 2/19/2018.            Pending Results Instructions     If you had any lab results that were not finalized at the time of your Discharge, you can call the ED Lab Result RN at [phone]. You will be contacted by this team for any positive Lab results or changes in treatment. The nurses are available 7 days a week from 10A to 6:30P.  You can leave a message 24 hours per day and they will return your call.        Test Results From Your Hospital Stay        2/18/2018  1:52 PM      Component Results     Component Value Ref Range & Units Status    WBC 9.6 4.0 - 11.0 10e9/L Final    RBC Count 4.91 3.8 - 5.2 10e12/L Final    Hemoglobin 15.2 11.7 - 15.7 g/dL Final    Hematocrit 44.3 35.0 - 47.0 % Final    MCV 90 78 - 100 fl Final    MCH 31.0 26.5 - 33.0 pg Final    MCHC 34.3 31.5 - 36.5 g/dL Final    RDW 13.2 10.0 - 15.0 % Final    Platelet Count 239 150 - 450 10e9/L Final    Diff Method Automated Method  Final    % Neutrophils 54.3 % Final    % Lymphocytes 33.3 % Final    % Monocytes 9.3 % Final    % Eosinophils 2.4 % Final    % Basophils 0.4 % Final    % Immature Granulocytes 0.3 % Final    Absolute Neutrophil 5.2 1.6 - 8.3 10e9/L Final    Absolute Lymphocytes 3.2 0.8 - 5.3 10e9/L Final    Absolute Monocytes 0.9 0.0 - 1.3 10e9/L Final    Absolute Eosinophils 0.2 0.0 - 0.7 10e9/L Final    Absolute Basophils 0.0 0.0 - 0.2 10e9/L Final    Abs Immature  Granulocytes 0.0 0 - 0.4 10e9/L Final         2/18/2018  1:59 PM      Component Results     Component Value Ref Range & Units Status    Sodium 140 133 - 144 mmol/L Final    Potassium 3.7 3.4 - 5.3 mmol/L Final    Chloride 106 94 - 109 mmol/L Final    Carbon Dioxide 27 20 - 32 mmol/L Final    Anion Gap 7 3 - 14 mmol/L Final    Glucose 102 (H) 70 - 99 mg/dL Final    Urea Nitrogen 11 7 - 30 mg/dL Final    Creatinine 0.54 0.52 - 1.04 mg/dL Final    GFR Estimate >90 >60 mL/min/1.7m2 Final    Non  GFR Calc    GFR Estimate If Black >90 >60 mL/min/1.7m2 Final    African American GFR Calc    Calcium 8.7 8.5 - 10.1 mg/dL Final         2/18/2018  1:59 PM      Component Results     Component Value Ref Range & Units Status    Troponin I ES <0.015 0.000 - 0.045 ug/L Final    The 99th percentile for upper reference range is 0.045 ug/L.  Troponin values   in the range of 0.045 - 0.120 ug/L may be associated with risks of adverse   clinical events.           2/18/2018  2:27 PM      Component Results     Component Value Ref Range & Units Status    Troponin I ES <0.015 0.000 - 0.045 ug/L Final    The 99th percentile for upper reference range is 0.045 ug/L.  Troponin values   in the range of 0.045 - 0.120 ug/L may be associated with risks of adverse   clinical events.           2/18/2018  2:21 PM      Component Results     Component Value Ref Range & Units Status    D Dimer <0.3 0.0 - 0.50 ug/ml FEU Final    This D-dimer assay is intended for use in conjunction with a clinical pretest   probability assessment model to exclude pulmonary embolism (PE) and deep   venous thrombosis (DVT) in outpatients suspected of PE or DVT. The cut-off   value is 0.5 ug/mL FEU.           2/18/2018  2:51 PM      Narrative     CHEST TWO VIEWS  2/18/2018 2:48 PM     HISTORY: Chest pain.    COMPARISON: 3/3/2017        Impression     IMPRESSION: Normal. No change.    VIOLET HEWITT MD                Thank you for choosing [facility]       Thank  "you for choosing [facility] for your care. Our goal is always to provide you with excellent care. Hearing back from our patients is one way we can continue to improve our services. Please take a few minutes to complete the written survey that you may receive in the mail after you visit with us. Thank you!        [employer] Information     [employer] lets you send messages to your doctor, view your test results, renew your prescriptions, schedule appointments and more. To sign up, go to [email or web address] . Click on \"Log in\" on the left side of the screen, which will take you to the Welcome page. Then click on \"Sign up Now\" on the right side of the page.     You will be asked to enter the access code listed below, as well as some personal information. Please follow the directions to create your username and password.     Your access code is: [email or web address]  Expires: 2018  2:59 PM     Your access code will  in 90 days. If you need help or a new code, please call your [place] clinic or [phone].        Care EveryWhere ID     This is your Care EveryWhere ID. This could be used by other organizations to access your [place] medical records  [id number]        Equal Access to Services     ADDI MOY : Marques Tejeda, waannabellada luis, qaybta kaalmada adezo, josy ponec. So [facility] [phone].    ATENCIÓN: Si habla español, tiene a lion disposición servicios gratuitos de asistencia lingüística. Llame al [phone].    We comply with applicable federal civil rights laws and [place] laws. We do not discriminate on the basis of race, color, national origin, age, disability, sex, sexual orientation, or gender identity.            After Visit Summary       This is your record. Keep this with you and show to your community pharmacist(s) and doctor(s) at your next visit.                  "

## 2018-02-18 NOTE — DISCHARGE INSTRUCTIONS
*CHEST PAIN, UNCERTAIN CAUSE    Based on your exam today, the exact cause of your chest pain is not certain. Your condition does not seem serious at this time, and your pain does not appear to be coming from your heart. However, sometimes the signs of a serious problem take more time to appear. Therefore, watch for the warning signs listed below.  HOME CARE:  1. Rest today and avoid strenuous activity.  2. Take any prescribed medicine as directed.  FOLLOW UP with your doctor in 1-3 days.   GET PROMPT MEDICAL ATTENTION if any of the following occur:    A change in the type of pain: if it feels different, becomes more severe, lasts longer, or begins to spread into your shoulder, arm, neck, jaw or back    Shortness of breath or increased pain with breathing    Weakness, dizziness, or fainting    Cough with blood or dark colored sputum (phlegm)    Fever over 101  F (38.3  C)    Swelling, pain or redness in one leg    3990-8466 [employer]. [address] [place] [address]. All rights reserved. This information is not intended as a substitute for professional medical care. Always follow your healthcare professional's instructions.  This information has been modified by your health care provider with permission from the publisher.      Chest Wall Pain: Costochondritis    The chest pain that you have had today is caused by costochondritis. This condition is caused by an inflammation of the cartilage joining your ribs to your breastbone. It is not caused by heart or lung problems. Your healthcare team has made sure that the chest pain you feel is not from a life threatening cause of chest pain such as heart attack, collapsed lung, blood clot in the lung, tear in the aorta, or esophageal rupture. The inflammation may have been brought on by a blow to the chest, lifting heavy objects, intense exercise, or an illness that made you cough and sneeze a lot. It often occurs during times of emotional  stress. It can be painful, but it is not dangerous. It usually goes away in 1 to 2 weeks. But it may happen again. Rarely, a more serious condition may cause symptoms similar to costochondritis. That s why it s important to watch for the warning signs listed below.  Home care  Follow these guidelines when caring for yourself at home:    If you feel that emotional stress is a cause of your condition, try to figure out the sources of that stress. It may not be obvious. Learn ways to deal with the stress in your life. This can include regular exercise, muscle relaxation, meditation, or simply taking time out for yourself.    You may use acetaminophen, ibuprofen, or naproxen to control pain, unless another pain medicine was prescribed. If you have liver or kidney disease or ever had a stomach ulcer, talk with your healthcare provider before using these medicines.    You can also help ease pain by using a hot, wet compress or heating pad. Use this with or without a medicated skin cream that helps relieves pain.    Do stretching exercise as advised by your provider.    Take any prescribed medicines as directed.  Follow-up care  Follow up with your healthcare provider, or as advised, if you do not start to get better in the next 2 days.  When to seek medical advice  Call your healthcare provider right away if any of these occur:    A change in the type of pain. Call if it feels different, becomes more serious, lasts longer, or spreads into your shoulder, arm, neck, jaw, or back.    Shortness of breath or pain gets worse when you breathe    Weakness, dizziness, or fainting    Cough with dark-colored sputum (phlegm) or blood    Abdominal pain    Dark red or black stools    Fever of 100.4 F (38 C) or higher, or as directed by your healthcare provider  Date Last Reviewed: 12/1/2016 2000-2017 [employer]. [address] [place] [address], [place] [address]. All rights reserved. This information is not intended as a  substitute for professional medical care. Always follow your healthcare professional's instructions.      Tylenol/ibuprofen for discomfort

## 2018-02-18 NOTE — ED NOTES
4 days of L side of CP rad into L arm, yest pt reports an episode lasting approx 5 sec where her heart was racing and she felt dizzy and lightheaded. Pt denies any cardiac hx. Currently cp is 4/10 comes and goes, can make it worse with mvt and deep breathing, NSR. No ectopy noted at this time.

## 2018-02-18 NOTE — ED PROVIDER NOTES
History     Chief Complaint   Patient presents with     Chest Pain     Pt having episodes of chest pain for the past 4 days. Has burning feeling, radiating down L arm.  Had episode of palpitations yesterday.       Foot Pain     Pt having tight feeling in L foot, states it is purple and sore.      HPI  Funmi Coughlin is a 37 year old female who presents from home.  She complains of 4 days left mid lower precordial chest discomfort described as a burning sensation, waxes and wanes with deep breathing, denies associated cough, fever or shortness of air.  Denies inciting trauma or strain.  No exertional component, she cleaned the garage yesterday without significant worsening of her symptoms.  Denies near syncope, diaphoresis or associated nausea.  Similar symptoms in the distant past.  She describes radiation of the discomfort into her left upper arm medial aspect, denies left arm numbness weakness or paresthesia.  She describes chronic neck pain and stiffness, no history of cervical disc disease.  Her neck pain is however worse over the past few days, and seems to be associated with bitemporal headache.  Denies visual change, difficulty speaking or swallowing, peripheral numbness weakness or paresthesia as noted above.  Patient also complains of left foot pain over the forefoot, she was wearing tennis shoes yesterday cleaning out the garage, no known injury, however approximately 6 months ago she had severe injury to the forefoot with sprain, she reports x-ray ×2 were unremarkable, treated with walking boot, and this pain waxes and wanes since the injury.  Patient smokes less than half pack cigarettes per day.  No other risk for vascular disease.    Problem List:    Patient Active Problem List    Diagnosis Date Noted     Depressive disorder, not elsewhere classified 10/23/2006     Priority: Medium     Other acne 09/11/2006     Priority: Medium     Encounter for other general counseling or advice on contraception  09/11/2006     Priority: Medium     Diagnosis updated by automated process. Provider to review and confirm.          Past Medical History:    No past medical history on file.    Past Surgical History:    No past surgical history on file.    Family History:    Family History   Problem Relation Age of Onset     Hypertension Mother      CANCER Maternal Grandmother      uterine       Social History:  Marital Status:   [2]  Social History   Substance Use Topics     Smoking status: Current Every Day Smoker     Packs/day: 0.50     Years: 8.00     Types: Cigarettes     Smokeless tobacco: Not on file      Comment: Declines quit plan card     Alcohol use Yes      Comment: Rare        Medications:      sucralfate (CARAFATE) 1 GM/10ML suspension   cyclobenzaprine (FLEXERIL) 10 MG tablet   sulindac (CLINORIL) 200 MG tablet   IBUPROFEN PO   OMEPRAZOLE PO         Review of Systems  ROS: All other systems reviewed and are negative.    Physical Exam   BP: 134/89  Heart Rate: 94  Temp: 98.6  F (37  C)  Resp: 16  SpO2: 100 %      Physical Exam  Nontoxic appearing no respiratory distress alert and oriented ×3  Head atraumatic normocephalic  TMs/EACs unremarkable, conjunctiva noninjected, oropharynx moist without lesions or erythema  No cervical adenopathy   No left axillary adenopathy  Trunk is without rash  Neck supple full active painless range of motion  Lungs clear to auscultation  Left mid precordial chest wall tenderness reproduces pain complaint  Heart regular no murmur  Abdomen soft nontender bowel sounds positive no masses or HSM  Strength and sensation grossly intact throughout the extremities, gait and station normal  Speech is fluent, good eye contact, thought processes are rational  Lower extremities without swelling, redness or tenderness  Pedal pulses symmetrical and strong  Left foot shows no swelling redness or ecchymosis, she has tenderness overlying the distal metatarsals, and pad of the foot    ED Course      ED Course     Procedures  ECG: Normal rate and rhythm, axis and intervals within normal limits, no acute ST or T wave changes. Read by Dr. Roldan Lopez          Critical Care time:  none      Results for orders placed or performed during the hospital encounter of 02/18/18   Chest XR,  PA & LAT    Narrative    CHEST TWO VIEWS  2/18/2018 2:48 PM     HISTORY: Chest pain.    COMPARISON: 3/3/2017      Impression    IMPRESSION: Normal. No change.    VIOLET HEWITT MD   CBC with platelets, differential   Result Value Ref Range    WBC 9.6 4.0 - 11.0 10e9/L    RBC Count 4.91 3.8 - 5.2 10e12/L    Hemoglobin 15.2 11.7 - 15.7 g/dL    Hematocrit 44.3 35.0 - 47.0 %    MCV 90 78 - 100 fl    MCH 31.0 26.5 - 33.0 pg    MCHC 34.3 31.5 - 36.5 g/dL    RDW 13.2 10.0 - 15.0 %    Platelet Count 239 150 - 450 10e9/L    Diff Method Automated Method     % Neutrophils 54.3 %    % Lymphocytes 33.3 %    % Monocytes 9.3 %    % Eosinophils 2.4 %    % Basophils 0.4 %    % Immature Granulocytes 0.3 %    Absolute Neutrophil 5.2 1.6 - 8.3 10e9/L    Absolute Lymphocytes 3.2 0.8 - 5.3 10e9/L    Absolute Monocytes 0.9 0.0 - 1.3 10e9/L    Absolute Eosinophils 0.2 0.0 - 0.7 10e9/L    Absolute Basophils 0.0 0.0 - 0.2 10e9/L    Abs Immature Granulocytes 0.0 0 - 0.4 10e9/L   Basic metabolic panel   Result Value Ref Range    Sodium 140 133 - 144 mmol/L    Potassium 3.7 3.4 - 5.3 mmol/L    Chloride 106 94 - 109 mmol/L    Carbon Dioxide 27 20 - 32 mmol/L    Anion Gap 7 3 - 14 mmol/L    Glucose 102 (H) 70 - 99 mg/dL    Urea Nitrogen 11 7 - 30 mg/dL    Creatinine 0.54 0.52 - 1.04 mg/dL    GFR Estimate >90 >60 mL/min/1.7m2    GFR Estimate If Black >90 >60 mL/min/1.7m2    Calcium 8.7 8.5 - 10.1 mg/dL   Troponin I   Result Value Ref Range    Troponin I ES <0.015 0.000 - 0.045 ug/L   Troponin I   Result Value Ref Range    Troponin I ES <0.015 0.000 - 0.045 ug/L   D dimer quantitative   Result Value Ref Range    D Dimer <0.3 0.0 - 0.50 ug/ml FEU             Assessments & Plan (with Medical Decision Making)  37-year-old female presents with chest pain details per HPI, left foot pain details per HPI.  Usual differential considered with respect to chest pain, no evidence for acute coronary syndrome, pulmonary embolism, pneumonia, pneumothorax, thoracic aortic dissection versus other.  Findings consistent with chest wall pain.  Observation, ibuprofen Tylenol, return criteria    With respect to left foot pain ongoing since injury 6 months ago, patient declines x-ray, recommend follow-up podiatry.     I have reviewed the nursing notes.    I have reviewed the findings, diagnosis, plan and need for follow up with the patient.       New Prescriptions    No medications on file       Final diagnoses:   Chest wall pain   Left foot pain       2/18/2018   [facility] EMERGENCY DEPARTMENT     Roldan Lopez MD  02/18/18 3100

## 2019-04-30 ENCOUNTER — HOSPITAL ENCOUNTER (EMERGENCY)
Facility: CLINIC | Age: 39
Discharge: HOME | End: 2019-04-30
Attending: FAMILY MEDICINE | Admitting: FAMILY MEDICINE
Payer: COMMERCIAL

## 2019-04-30 VITALS
SYSTOLIC BLOOD PRESSURE: 143 MMHG | TEMPERATURE: 98.3 F | WEIGHT: 211 LBS | OXYGEN SATURATION: 99 % | DIASTOLIC BLOOD PRESSURE: 100 MMHG | BODY MASS INDEX: 37.39 KG/M2 | HEIGHT: 63 IN | RESPIRATION RATE: 20 BRPM

## 2019-04-30 DIAGNOSIS — S86.912A MUSCLE STRAIN, LOWER LEG, LEFT, INITIAL ENCOUNTER: ICD-10-CM

## 2019-04-30 PROCEDURE — 93971 EXTREMITY STUDY: CPT

## 2019-04-30 PROCEDURE — 99213 OFFICE O/P EST LOW 20 MIN: CPT | Mod: 25 | Performed by: FAMILY MEDICINE

## 2019-04-30 PROCEDURE — 93971 EXTREMITY STUDY: CPT | Mod: 26 | Performed by: FAMILY MEDICINE

## 2019-04-30 PROCEDURE — G0463 HOSPITAL OUTPT CLINIC VISIT: HCPCS

## 2019-04-30 ASSESSMENT — MIFFLIN-ST. JEOR: SCORE: 1606.22

## 2019-04-30 NOTE — ED AVS SNAPSHOT
[facility] Emergency Department  [address] [facility] [address]  Phone:  [phone]  Fax:  [phone]                                    Funmi Coughlin   MRN: [id number]    Department:  [facility] Emergency Department   Date of Visit:  4/30/2019           After Visit Summary Signature Page    I have received my discharge instructions, and my questions have been answered. I have discussed any challenges I see with this plan with the nurse or doctor.    ..........................................................................................................................................  Patient/Patient Representative Signature      ..........................................................................................................................................  Patient Representative Print Name and Relationship to Patient    ..................................................               ................................................  Date                                   Time    ..........................................................................................................................................  Reviewed by Signature/Title    ...................................................              ..............................................  Date                                               Time          22EPIC Rev 08/18

## 2019-05-01 NOTE — ED NOTES
Pt presents to ER with c/o RLE pain, sudden onset x 1 hour ago.  There is no known injury.  Pt denies flights and long car trips.  She is not SOA.  She has a drug eluding BC IUD in place and is also a smoker.

## 2019-05-01 NOTE — ED PROVIDER NOTES
History     Chief Complaint   Patient presents with     Leg Pain     right calf pain, onset one hour ago     HPI  Funmi Coughlin is a 38 year old female who presents with right calf pain.  This came on this evening suddenly when she was in the kitchen preparing dinner and went to turn and had a sudden sharp pain in her medial proximal right calf.  She reports that she is been trying to get more fit and has been exercising on the treadmill and walking outside.  She is concerned she has a blood clot in her leg because she has not had pain like this in the past.  No chest pain, shortness of breath, leg swelling.  No history of DVT.    Allergies:  Allergies   Allergen Reactions     Amoxicillin Rash     Ceclor [Cefaclor] Rash     Codeine Sulfate Nausea and Vomiting     Penicillins Rash     Varenicline Other (See Comments)     Nightmares       Problem List:    Patient Active Problem List    Diagnosis Date Noted     Depressive disorder, not elsewhere classified 10/23/2006     Priority: Medium     Other acne 09/11/2006     Priority: Medium     Encounter for other general counseling or advice on contraception 09/11/2006     Priority: Medium     Diagnosis updated by automated process. Provider to review and confirm.          Past Medical History:    No past medical history on file.    Past Surgical History:    No past surgical history on file.    Family History:    Family History   Problem Relation Age of Onset     Hypertension Mother      Cancer Maternal Grandmother         uterine       Social History:  Marital Status:   [2]  Social History     Tobacco Use     Smoking status: Current Every Day Smoker     Packs/day: 0.50     Years: 8.00     Pack years: 4.00     Types: Cigarettes     Tobacco comment: Declines quit plan card   Substance Use Topics     Alcohol use: Yes     Comment: Rare     Drug use: No        Medications:      IBUPROFEN PO   OMEPRAZOLE PO         Review of Systems  Further problem focused system  "review negative.    Physical Exam   BP: (!) 143/100  Heart Rate: 90  Temp: 98.3  F (36.8  C)  Resp: 20  Height: 160 cm (5' 3\")  Weight: 95.7 kg (211 lb)  SpO2: 99 %      Physical Exam  Nursing note and vitals were reviewed.  Constitutional: Awake and alert, adequately nourished and developed appearing 38-year-old in no apparent discomfort, who does not appear acutely ill, and who answers questions appropriately and cooperates with examination.    Musculoskeletal: Extremities are warm and well-perfused and without edema.  There is tenderness over the proximal medial calf without swelling, erythema, ecchymosis.  Knee motion is normal.  The foot is warm and well perfused.  DP and PT pulses are intact.  Neurological: Alert, oriented, thought content logical, coherent   Skin: Warm, dry, no rashes.  Psychiatric: Affect broad and appropriate.      ED Course        Procedures    Results for orders placed during the hospital encounter of 04/30/19   POC US FOR DVT UNILATERAL LIMITED    Impression Limited bedside ultrasound for deep venous thrombosis assessment, performed and interpreted by me.   Indication: LE pain  Compression ultrasonography was examined using a standardized 2 area compression technique.  The right LE was examined.    Examination of the common femoral vein began just above the common femoral saphenous junction. Staggered compression was performed every cm beyond the point of the deep femoral and superficial femoral junction, evaluating for complete coaptation of the vessel. The ultrasound probe was then moved to the popliteal space where the proximal popliteal vein was similarly examined beyond the point of trifurcation. The calf veins were not examined.    Findings Entire venous system as outlined above was easily compressed to complete coaptation.   IMPRESSION: No evidence of DVT in the right lower extremity(S).               Critical Care time:  none               Results for orders placed or performed " "during the hospital encounter of 04/30/19 (from the past 24 hour(s))   POC US FOR DVT UNILATERAL LIMITED    Impression    Limited bedside ultrasound for deep venous thrombosis assessment, performed and interpreted by me.   Indication: LE pain  Compression ultrasonography was examined using a standardized 2 area compression technique.  The right LE was examined.    Examination of the common femoral vein began just above the common femoral saphenous junction. Staggered compression was performed every cm beyond the point of the deep femoral and superficial femoral junction, evaluating for complete coaptation of the vessel. The ultrasound probe was then moved to the popliteal space where the proximal popliteal vein was similarly examined beyond the point of trifurcation. The calf veins were not examined.    Findings Entire venous system as outlined above was easily compressed to complete coaptation.   IMPRESSION: No evidence of DVT in the right lower extremity(S).         Medications - No data to display    Assessments & Plan (with Medical Decision Making)     38-year-old female presented with sudden onset of pain in the right calf as described above.  This is somewhat suggestive of a \"tennis leg\"--Strain of the medial head of the gastrocnemius due to unaccustomed exercise.  Could also represent a plantaris muscle tear.  I feel confident this is a myofascial injury.  She was concerned this represented DVT.  I explained the symptoms are atypical.  We did do a bedside ultrasound which was normal and showed no evidence of DVT.  Symptomatic care was outlined with gentle stretching and acetaminophen.  She does not like to take ibuprofen because it tends to aggravate her reflux.  Be seen if not mostly better in a week or if new concerning symptoms develop.    I have reviewed the nursing notes.    I have reviewed the findings, diagnosis, plan and need for follow up with the patient.          Medication List      There are no " discharge medications for this visit.         Final diagnoses:   Muscle strain, lower leg, left, initial encounter       4/30/2019   [facility] EMERGENCY DEPARTMENT     Clem Tellez MD  04/30/19 2002

## 2019-05-01 NOTE — DISCHARGE INSTRUCTIONS
Gently stretch your calf muscles at least 3 times per day.  You may use acetaminophen 1000 mg 4 times per day if needed for pain.  Be seen if not mostly better in 1 week or if new concerning symptoms

## 2019-09-08 ENCOUNTER — APPOINTMENT (OUTPATIENT)
Dept: CT IMAGING | Facility: CLINIC | Age: 39
End: 2019-09-08
Attending: FAMILY MEDICINE
Payer: COMMERCIAL

## 2019-09-08 ENCOUNTER — HOSPITAL ENCOUNTER (EMERGENCY)
Facility: CLINIC | Age: 39
Discharge: HOME | End: 2019-09-08
Attending: FAMILY MEDICINE | Admitting: FAMILY MEDICINE
Payer: COMMERCIAL

## 2019-09-08 VITALS
BODY MASS INDEX: 37.38 KG/M2 | DIASTOLIC BLOOD PRESSURE: 83 MMHG | TEMPERATURE: 98.2 F | RESPIRATION RATE: 16 BRPM | WEIGHT: 211 LBS | HEART RATE: 78 BPM | OXYGEN SATURATION: 97 % | SYSTOLIC BLOOD PRESSURE: 124 MMHG

## 2019-09-08 DIAGNOSIS — S39.011A STRAIN OF ABDOMINAL WALL, INITIAL ENCOUNTER: ICD-10-CM

## 2019-09-08 LAB
ALBUMIN SERPL-MCNC: 3.7 G/DL (ref 3.4–5)
ALBUMIN UR-MCNC: NEGATIVE MG/DL
ALP SERPL-CCNC: 85 U/L (ref 40–150)
ALT SERPL W P-5'-P-CCNC: 39 U/L (ref 0–50)
ANION GAP SERPL CALCULATED.3IONS-SCNC: 7 MMOL/L (ref 3–14)
APPEARANCE UR: ABNORMAL
AST SERPL W P-5'-P-CCNC: 22 U/L (ref 0–45)
BACTERIA #/AREA URNS HPF: ABNORMAL /HPF
BASOPHILS # BLD AUTO: 0.1 10E9/L (ref 0–0.2)
BASOPHILS NFR BLD AUTO: 0.8 %
BILIRUB SERPL-MCNC: 0.4 MG/DL (ref 0.2–1.3)
BILIRUB UR QL STRIP: NEGATIVE
BUN SERPL-MCNC: 8 MG/DL (ref 7–30)
CALCIUM SERPL-MCNC: 9 MG/DL (ref 8.5–10.1)
CHLORIDE SERPL-SCNC: 108 MMOL/L (ref 94–109)
CO2 SERPL-SCNC: 22 MMOL/L (ref 20–32)
COLOR UR AUTO: YELLOW
CREAT SERPL-MCNC: 0.6 MG/DL (ref 0.52–1.04)
DIFFERENTIAL METHOD BLD: NORMAL
EOSINOPHIL # BLD AUTO: 0.2 10E9/L (ref 0–0.7)
EOSINOPHIL NFR BLD AUTO: 2.9 %
ERYTHROCYTE [DISTWIDTH] IN BLOOD BY AUTOMATED COUNT: 12.6 % (ref 10–15)
GFR SERPL CREATININE-BSD FRML MDRD: >90 ML/MIN/{1.73_M2}
GLUCOSE SERPL-MCNC: 90 MG/DL (ref 70–99)
GLUCOSE UR STRIP-MCNC: NEGATIVE MG/DL
HCG SERPL QL: NEGATIVE
HCT VFR BLD AUTO: 45.6 % (ref 35–47)
HGB BLD-MCNC: 14.9 G/DL (ref 11.7–15.7)
HGB UR QL STRIP: NEGATIVE
IMM GRANULOCYTES # BLD: 0 10E9/L (ref 0–0.4)
IMM GRANULOCYTES NFR BLD: 0.5 %
KETONES UR STRIP-MCNC: NEGATIVE MG/DL
LEUKOCYTE ESTERASE UR QL STRIP: NEGATIVE
LYMPHOCYTES # BLD AUTO: 3 10E9/L (ref 0.8–5.3)
LYMPHOCYTES NFR BLD AUTO: 40.1 %
MCH RBC QN AUTO: 30.3 PG (ref 26.5–33)
MCHC RBC AUTO-ENTMCNC: 32.7 G/DL (ref 31.5–36.5)
MCV RBC AUTO: 93 FL (ref 78–100)
MONOCYTES # BLD AUTO: 0.7 10E9/L (ref 0–1.3)
MONOCYTES NFR BLD AUTO: 9.1 %
MUCOUS THREADS #/AREA URNS LPF: PRESENT /LPF
NEUTROPHILS # BLD AUTO: 3.5 10E9/L (ref 1.6–8.3)
NEUTROPHILS NFR BLD AUTO: 46.6 %
NITRATE UR QL: NEGATIVE
NRBC # BLD AUTO: 0 10*3/UL
NRBC BLD AUTO-RTO: 0 /100
PH UR STRIP: 6 PH (ref 5–7)
PLATELET # BLD AUTO: 257 10E9/L (ref 150–450)
POTASSIUM SERPL-SCNC: 3.9 MMOL/L (ref 3.4–5.3)
PROT SERPL-MCNC: 7.7 G/DL (ref 6.8–8.8)
RBC # BLD AUTO: 4.91 10E12/L (ref 3.8–5.2)
RBC #/AREA URNS AUTO: <1 /HPF (ref 0–2)
SODIUM SERPL-SCNC: 137 MMOL/L (ref 133–144)
SOURCE: ABNORMAL
SP GR UR STRIP: 1.01 (ref 1–1.03)
SQUAMOUS #/AREA URNS AUTO: 14 /HPF (ref 0–1)
UROBILINOGEN UR STRIP-MCNC: 0 MG/DL (ref 0–2)
WBC # BLD AUTO: 7.5 10E9/L (ref 4–11)
WBC #/AREA URNS AUTO: 2 /HPF (ref 0–5)

## 2019-09-08 PROCEDURE — 25000128 H RX IP 250 OP 636: Performed by: FAMILY MEDICINE

## 2019-09-08 PROCEDURE — 99282 EMERGENCY DEPT VISIT SF MDM: CPT | Mod: Z6 | Performed by: FAMILY MEDICINE

## 2019-09-08 PROCEDURE — 74177 CT ABD & PELVIS W/CONTRAST: CPT

## 2019-09-08 PROCEDURE — 99285 EMERGENCY DEPT VISIT HI MDM: CPT | Mod: 25 | Performed by: FAMILY MEDICINE

## 2019-09-08 PROCEDURE — 80053 COMPREHEN METABOLIC PANEL: CPT | Performed by: FAMILY MEDICINE

## 2019-09-08 PROCEDURE — 25000125 ZZHC RX 250: Performed by: FAMILY MEDICINE

## 2019-09-08 PROCEDURE — 84703 CHORIONIC GONADOTROPIN ASSAY: CPT | Performed by: FAMILY MEDICINE

## 2019-09-08 PROCEDURE — 85025 COMPLETE CBC W/AUTO DIFF WBC: CPT | Performed by: FAMILY MEDICINE

## 2019-09-08 PROCEDURE — 81001 URINALYSIS AUTO W/SCOPE: CPT | Performed by: FAMILY MEDICINE

## 2019-09-08 RX ORDER — PHENTERMINE HYDROCHLORIDE 30 MG/1
30 CAPSULE ORAL
COMMUNITY
Start: 2019-07-05 | End: 2019-09-08

## 2019-09-08 RX ORDER — IOPAMIDOL 755 MG/ML
100 INJECTION, SOLUTION INTRAVASCULAR ONCE
Status: COMPLETED | OUTPATIENT
Start: 2019-09-08 | End: 2019-09-08

## 2019-09-08 RX ADMIN — IOPAMIDOL 100 ML: 755 INJECTION, SOLUTION INTRAVENOUS at 14:14

## 2019-09-08 RX ADMIN — SODIUM CHLORIDE 66 ML: 9 INJECTION, SOLUTION INTRAVENOUS at 14:14

## 2019-09-08 NOTE — ED AVS SNAPSHOT
[facility] Emergency Department  [address] [facility] [address]  Phone:  [phone]  Fax:  [phone]                                    Funmi Coughlin   MRN: [id number]    Department:  [facility] Emergency Department   Date of Visit:  9/8/2019           After Visit Summary Signature Page    I have received my discharge instructions, and my questions have been answered. I have discussed any challenges I see with this plan with the nurse or doctor.    ..........................................................................................................................................  Patient/Patient Representative Signature      ..........................................................................................................................................  Patient Representative Print Name and Relationship to Patient    ..................................................               ................................................  Date                                   Time    ..........................................................................................................................................  Reviewed by Signature/Title    ...................................................              ..............................................  Date                                               Time          22EPIC Rev 08/18

## 2019-09-08 NOTE — ED PROVIDER NOTES
"  History     Chief Complaint   Patient presents with     Abdominal Pain     HPI  Funmi Coughlin is a 39 year old female with a history significant for kidney stones, ovarian cysts and status post cholecystectomy. She presents to the emergency department with her daughter for evaluation of left lower quadrant abdominal pain since Wednesday (09/04/2019). She states that whenever she bends at the waist she will feel \"sharp stabbing pain\" that started to the left of the umbilicus and has since traveled to the umbilicus. The patient also reports that she has had nausea, loss of appetite, dysuria and urinary frequency. She also reports that while not bending at her waist she will feel a \"burning pain\" across her lower abdomin. The patient states that she is scheduled for a colonoscopy due to her recent problem with \"on and off\" diarrhea. The patient denies hemochenzia, fever, shortness of breath, recent injury or heavy lifting.    The patient reports she does have an intrauterine device and the last time she had any bleeding was approximately 5 weeks ago.    Allergies:  Allergies   Allergen Reactions     Amoxicillin Rash     Ceclor [Cefaclor] Rash     Codeine Sulfate Nausea and Vomiting     Penicillins Rash     Varenicline Other (See Comments)     Nightmares       Problem List:    Patient Active Problem List    Diagnosis Date Noted     Depressive disorder, not elsewhere classified 10/23/2006     Priority: Medium     Other acne 09/11/2006     Priority: Medium     Encounter for other general counseling or advice on contraception 09/11/2006     Priority: Medium     Diagnosis updated by automated process. Provider to review and confirm.          Past Medical History:    No past medical history on file.    Past Surgical History:    No past surgical history on file.    Family History:    Family History   Problem Relation Age of Onset     Hypertension Mother      Cancer Maternal Grandmother         uterine       Social " History:  Marital Status:   [2]  Social History     Tobacco Use     Smoking status: Current Every Day Smoker     Packs/day: 0.50     Years: 8.00     Pack years: 4.00     Types: Cigarettes     Tobacco comment: Declines quit plan card   Substance Use Topics     Alcohol use: Yes     Comment: Rare     Drug use: No        Medications:      OMEPRAZOLE PO         Review of Systems    All other systems are reviewed and are negative    Physical Exam   BP: 134/79  Pulse: 99  Temp: 98.2  F (36.8  C)  Resp: 16  Weight: 95.7 kg (211 lb)  SpO2: 97 %      Physical Exam    Nursing note and vitals were reviewed.  Constitutional: Awake and alert, adequately nourished and developed appearing 39-year-old in no apparent discomfort, who does not appear acutely ill, and who answers questions appropriately and cooperates with examination.  HEENT: EOMI.   Neck: Freely mobile.  Cardiovascular: Cardiac examination reveals normal heart rate and regular rhythm without murmur.  Pulmonary/Chest: Breathing is unlabored.  Breath sounds are clear and equal bilaterally.  There no retractions, tachypnea, rales, wheezes, or rhonchi.  Abdomen: Soft, tender in the left mid quadrant.  There is pain with gabriele the abdominal wall muscles.  There is no rebound or guarding.  No HSM or masses.  Musculoskeletal: Extremities are warm and well-perfused and without edema  Neurological: Alert, oriented, thought content logical, coherent   Skin: Warm, dry, no rashes.  Psychiatric: Affect broad and appropriate.      ED Course        Procedures               Critical Care time:  none               Results for orders placed or performed during the hospital encounter of 09/08/19 (from the past 24 hour(s))   CBC with platelets differential   Result Value Ref Range    WBC 7.5 4.0 - 11.0 10e9/L    RBC Count 4.91 3.8 - 5.2 10e12/L    Hemoglobin 14.9 11.7 - 15.7 g/dL    Hematocrit 45.6 35.0 - 47.0 %    MCV 93 78 - 100 fl    MCH 30.3 26.5 - 33.0 pg    MCHC 32.7  31.5 - 36.5 g/dL    RDW 12.6 10.0 - 15.0 %    Platelet Count 257 150 - 450 10e9/L    Diff Method Automated Method     % Neutrophils 46.6 %    % Lymphocytes 40.1 %    % Monocytes 9.1 %    % Eosinophils 2.9 %    % Basophils 0.8 %    % Immature Granulocytes 0.5 %    Nucleated RBCs 0 0 /100    Absolute Neutrophil 3.5 1.6 - 8.3 10e9/L    Absolute Lymphocytes 3.0 0.8 - 5.3 10e9/L    Absolute Monocytes 0.7 0.0 - 1.3 10e9/L    Absolute Eosinophils 0.2 0.0 - 0.7 10e9/L    Absolute Basophils 0.1 0.0 - 0.2 10e9/L    Abs Immature Granulocytes 0.0 0 - 0.4 10e9/L    Absolute Nucleated RBC 0.0    Comprehensive metabolic panel   Result Value Ref Range    Sodium 137 133 - 144 mmol/L    Potassium 3.9 3.4 - 5.3 mmol/L    Chloride 108 94 - 109 mmol/L    Carbon Dioxide 22 20 - 32 mmol/L    Anion Gap 7 3 - 14 mmol/L    Glucose 90 70 - 99 mg/dL    Urea Nitrogen 8 7 - 30 mg/dL    Creatinine 0.60 0.52 - 1.04 mg/dL    GFR Estimate >90 >60 mL/min/[1.73_m2]    GFR Estimate If Black >90 >60 mL/min/[1.73_m2]    Calcium 9.0 8.5 - 10.1 mg/dL    Bilirubin Total 0.4 0.2 - 1.3 mg/dL    Albumin 3.7 3.4 - 5.0 g/dL    Protein Total 7.7 6.8 - 8.8 g/dL    Alkaline Phosphatase 85 40 - 150 U/L    ALT 39 0 - 50 U/L    AST 22 0 - 45 U/L   HCG qualitative Blood   Result Value Ref Range    HCG Qualitative Serum Negative NEG^Negative   UA reflex to Microscopic   Result Value Ref Range    Color Urine Yellow     Appearance Urine Slightly Cloudy     Glucose Urine Negative NEG^Negative mg/dL    Bilirubin Urine Negative NEG^Negative    Ketones Urine Negative NEG^Negative mg/dL    Specific Gravity Urine 1.014 1.003 - 1.035    Blood Urine Negative NEG^Negative    pH Urine 6.0 5.0 - 7.0 pH    Protein Albumin Urine Negative NEG^Negative mg/dL    Urobilinogen mg/dL 0.0 0.0 - 2.0 mg/dL    Nitrite Urine Negative NEG^Negative    Leukocyte Esterase Urine Negative NEG^Negative    Source Midstream Urine     RBC Urine <1 0 - 2 /HPF    WBC Urine 2 0 - 5 /HPF    Bacteria Urine Few  (A) NEG^Negative /HPF    Squamous Epithelial /HPF Urine 14 (H) 0 - 1 /HPF    Mucous Urine Present (A) NEG^Negative /LPF   CT Abdomen Pelvis w Contrast    Narrative    CT ABDOMEN AND PELVIS WITH CONTRAST   9/8/2019 2:29 PM     HISTORY: LLQ abdominal pain.    TECHNIQUE:  CT abdomen and pelvis with 100 mL Isovue 370 IV. Radiation  dose for this scan was reduced using automated exposure control,  adjustment of the mA and/or kV according to patient size, or iterative  reconstruction technique.    COMPARISON: CT abdomen and pelvis 1/5/2012.    FINDINGS: Cholecystectomy. Liver, adrenals, spleen, pancreas, and  kidneys do not show any acute abnormalities. No hydronephrosis or  urolithiasis identified. No acute bowel abnormality. The appendix  appears normal. No abscess or free air. IUD in the uterus. Left  ovarian cyst suggested measuring 1.6 cm.      Impression    IMPRESSION:  1. No acute abnormality is seen.  2. Incidental left ovarian cyst.       Medications   iopamidol (ISOVUE-370) solution 100 mL (100 mLs Intravenous Given 9/8/19 1414)   sodium chloride 0.9 % bag 500mL for CT scan flush use (66 mLs Intravenous Given 9/8/19 1414)        12:17 PM patient assessed. Course of care outlined.    We discussed that laboratory studies are all normal.  We discussed the option of whether further imaging would be helpful.  She would like to have a CT scan of her abdomen and pelvis.  She understands it may be normal.  Assessments & Plan (with Medical Decision Making)     39-year-old female presented with left mid quadrant abdominal pain.  This was aggravated by movement and brought on by contraction of the abdominal wall muscles suggesting an abdominal wall muscle strain.  Laboratory testing blood and urine and CT scan of the abdomen were normal.  Suspect this is an abdominal wall muscle strain.  We discussed symptomatic care and avoidance of aggravating activity.  We discussed signs and symptoms that should prompt return to the  emergency department including development of fevers, vomiting, increased pain, or other concerning symptoms.  She expressed understanding and her questions were answered.    I have reviewed the nursing notes.    I have reviewed the findings, diagnosis, plan and need for follow up with the patient.       New Prescriptions    No medications on file       Final diagnoses:   Strain of abdominal wall, initial encounter     This document serves as a record of services personally performed by Clem Tellez MD. It was created on their behalf by Shazia Issa, a trained medical scribe. The creation of this record is based on the provider's personal observations and the statements of the patient. This document has been checked and approved by the attending provider.    9/8/2019   [facility] EMERGENCY DEPARTMENT     Clem Tellez MD  09/08/19 2150

## 2019-09-08 NOTE — ED NOTES
Patient states she has this burning abdominal pain that started this past Wednesday. It started to the left of the umbilicus and has since traveled to the umbilicus. Her gallbladder has been removed. She states the pain is worse when she bends over. Denies any N/V/D.

## 2019-09-08 NOTE — DISCHARGE INSTRUCTIONS
You may use acetaminophen 1000 mg 4 times per day if needed for pain.  You may add ibuprofen 400 mg 4 times per day if needed for pain.  Return to be seen if you develop fevers greater than 100.4, vomiting, increased pain, or other new concerning symptoms.  Avoid aggravating activity including heavy lifting, pushing, pulling.

## 2021-01-28 ENCOUNTER — IMMUNIZATION (OUTPATIENT)
Dept: NURSING | Facility: CLINIC | Age: 41
End: 2021-01-28
Payer: COMMERCIAL

## 2021-01-28 PROCEDURE — 91300 PR COVID VAC PFIZER DIL RECON 30 MCG/0.3 ML IM: CPT

## 2021-01-28 PROCEDURE — 0001A PR COVID VAC PFIZER DIL RECON 30 MCG/0.3 ML IM: CPT

## 2021-02-14 ENCOUNTER — HEALTH MAINTENANCE LETTER (OUTPATIENT)
Age: 41
End: 2021-02-14

## 2021-02-18 ENCOUNTER — IMMUNIZATION (OUTPATIENT)
Dept: NURSING | Facility: CLINIC | Age: 41
End: 2021-02-18
Attending: FAMILY MEDICINE
Payer: COMMERCIAL

## 2021-02-18 PROCEDURE — 0002A PR COVID VAC PFIZER DIL RECON 30 MCG/0.3 ML IM: CPT

## 2021-02-18 PROCEDURE — 91300 PR COVID VAC PFIZER DIL RECON 30 MCG/0.3 ML IM: CPT

## 2021-09-25 ENCOUNTER — HEALTH MAINTENANCE LETTER (OUTPATIENT)
Age: 41
End: 2021-09-25

## 2022-03-06 ENCOUNTER — HEALTH MAINTENANCE LETTER (OUTPATIENT)
Age: 42
End: 2022-03-06

## 2022-12-26 ENCOUNTER — HEALTH MAINTENANCE LETTER (OUTPATIENT)
Age: 42
End: 2022-12-26

## 2023-04-16 ENCOUNTER — HEALTH MAINTENANCE LETTER (OUTPATIENT)
Age: 43
End: 2023-04-16

## 2024-02-04 ENCOUNTER — HEALTH MAINTENANCE LETTER (OUTPATIENT)
Age: 44
End: 2024-02-04